# Patient Record
Sex: FEMALE | Race: BLACK OR AFRICAN AMERICAN | NOT HISPANIC OR LATINO | URBAN - METROPOLITAN AREA
[De-identification: names, ages, dates, MRNs, and addresses within clinical notes are randomized per-mention and may not be internally consistent; named-entity substitution may affect disease eponyms.]

---

## 2019-07-27 ENCOUNTER — HOSPITAL ENCOUNTER (EMERGENCY)
Facility: HOSPITAL | Age: 59
Discharge: HOME | End: 2019-07-27
Attending: EMERGENCY MEDICINE
Payer: COMMERCIAL

## 2019-07-27 VITALS
HEIGHT: 65 IN | DIASTOLIC BLOOD PRESSURE: 77 MMHG | BODY MASS INDEX: 16.16 KG/M2 | TEMPERATURE: 98 F | HEART RATE: 92 BPM | SYSTOLIC BLOOD PRESSURE: 135 MMHG | WEIGHT: 97 LBS | OXYGEN SATURATION: 100 % | RESPIRATION RATE: 15 BRPM

## 2019-07-27 DIAGNOSIS — S99.922A INJURY, FOOT, LEFT, INITIAL ENCOUNTER: ICD-10-CM

## 2019-07-27 DIAGNOSIS — M79.662 PAIN IN LEFT LOWER LEG: ICD-10-CM

## 2019-07-27 DIAGNOSIS — M25.562 LEFT KNEE PAIN: ICD-10-CM

## 2019-07-27 DIAGNOSIS — E87.6 HYPOKALEMIA: ICD-10-CM

## 2019-07-27 DIAGNOSIS — M25.572 ACUTE LEFT ANKLE PAIN: ICD-10-CM

## 2019-07-27 DIAGNOSIS — S92.902A CLOSED FRACTURE OF LEFT FOOT, INITIAL ENCOUNTER: Primary | ICD-10-CM

## 2019-07-27 DIAGNOSIS — R73.9 HYPERGLYCEMIA: ICD-10-CM

## 2019-07-27 PROBLEM — I10 HTN (HYPERTENSION), BENIGN: Status: ACTIVE | Noted: 2018-10-31

## 2019-07-27 PROBLEM — E78.2 MIXED HYPERLIPIDEMIA: Status: ACTIVE | Noted: 2018-10-31

## 2019-07-27 PROBLEM — E11.319 DIABETIC RETINOPATHY ASSOCIATED WITH TYPE 2 DIABETES MELLITUS: Status: ACTIVE | Noted: 2018-10-31

## 2019-07-27 PROBLEM — G89.29 CHRONIC PAIN OF LEFT KNEE: Status: ACTIVE | Noted: 2018-10-31

## 2019-07-27 LAB
ALBUMIN SERPL-MCNC: 3.9 G/DL (ref 3.3–5.5)
ALLENS TEST: ABNORMAL
ALP SERPL-CCNC: 52 U/L (ref 42–141)
BILIRUB SERPL-MCNC: 3.6 MG/DL (ref 0.2–1.6)
BILIRUBIN, POC UA: ABNORMAL
BLOOD, POC UA: NEGATIVE
BUN SERPL-MCNC: 11 MG/DL (ref 7–22)
CALCIUM SERPL-MCNC: 9.8 MG/DL (ref 8–10.3)
CHLORIDE SERPL-SCNC: 98 MMOL/L (ref 98–108)
CLARITY, POC UA: CLEAR
COLOR, POC UA: YELLOW
CREAT SERPL-MCNC: 0.7 MG/DL (ref 0.6–1.2)
DELSYS: ABNORMAL
GLUCOSE SERPL-MCNC: 231 MG/DL (ref 73–118)
GLUCOSE, POC UA: ABNORMAL
HCO3 UR-SCNC: 31 MMOL/L (ref 24–28)
KETONES, POC UA: ABNORMAL
LDH SERPL L TO P-CCNC: 1.15 MMOL/L (ref 0.5–2.2)
LEUKOCYTE EST, POC UA: NEGATIVE
NITRITE, POC UA: NEGATIVE
PCO2 BLDA: 52.3 MMHG (ref 35–45)
PH SMN: 7.38 [PH] (ref 7.35–7.45)
PH UR STRIP: 5.5 [PH]
PO2 BLDA: 23 MMHG (ref 40–60)
POC ALT (SGPT): 15 U/L (ref 10–47)
POC AST (SGOT): 18 U/L (ref 11–38)
POC BE: 5 MMOL/L
POC SATURATED O2: 38 % (ref 95–100)
POC TCO2: 31 MMOL/L (ref 18–33)
POC TCO2: 33 MMOL/L (ref 24–29)
POCT GLUCOSE: 186 MG/DL (ref 70–110)
POCT GLUCOSE: 275 MG/DL (ref 70–110)
POTASSIUM BLD-SCNC: 3.3 MMOL/L (ref 3.6–5.1)
PROTEIN, POC UA: ABNORMAL
PROTEIN, POC: 7.3 G/DL (ref 6.4–8.1)
SAMPLE: ABNORMAL
SITE: ABNORMAL
SODIUM BLD-SCNC: 137 MMOL/L (ref 128–145)
SPECIFIC GRAVITY, POC UA: 1.02
UROBILINOGEN, POC UA: 2 E.U./DL

## 2019-07-27 PROCEDURE — 82803 BLOOD GASES ANY COMBINATION: CPT | Mod: ER

## 2019-07-27 PROCEDURE — 25000003 PHARM REV CODE 250: Mod: ER | Performed by: EMERGENCY MEDICINE

## 2019-07-27 PROCEDURE — 96360 HYDRATION IV INFUSION INIT: CPT | Mod: ER

## 2019-07-27 PROCEDURE — 80053 COMPREHEN METABOLIC PANEL: CPT | Mod: ER

## 2019-07-27 PROCEDURE — 81003 URINALYSIS AUTO W/O SCOPE: CPT | Mod: ER

## 2019-07-27 PROCEDURE — 85025 COMPLETE CBC W/AUTO DIFF WBC: CPT | Mod: ER

## 2019-07-27 PROCEDURE — 96361 HYDRATE IV INFUSION ADD-ON: CPT | Mod: ER

## 2019-07-27 PROCEDURE — 99291 CRITICAL CARE FIRST HOUR: CPT | Mod: 25,ER

## 2019-07-27 PROCEDURE — 63600175 PHARM REV CODE 636 W HCPCS: Mod: ER | Performed by: EMERGENCY MEDICINE

## 2019-07-27 RX ORDER — METHOCARBAMOL 500 MG/1
500 TABLET, FILM COATED ORAL
Status: COMPLETED | OUTPATIENT
Start: 2019-07-27 | End: 2019-07-27

## 2019-07-27 RX ORDER — POTASSIUM CHLORIDE 20 MEQ/1
40 TABLET, EXTENDED RELEASE ORAL
Status: COMPLETED | OUTPATIENT
Start: 2019-07-27 | End: 2019-07-27

## 2019-07-27 RX ORDER — TRAMADOL HYDROCHLORIDE 50 MG/1
50 TABLET ORAL ONCE
Status: COMPLETED | OUTPATIENT
Start: 2019-07-27 | End: 2019-07-27

## 2019-07-27 RX ORDER — TRAMADOL HYDROCHLORIDE 50 MG/1
50 TABLET ORAL EVERY 6 HOURS PRN
Qty: 12 TABLET | Refills: 0 | Status: SHIPPED | OUTPATIENT
Start: 2019-07-27 | End: 2019-08-06

## 2019-07-27 RX ORDER — IBUPROFEN 600 MG/1
600 TABLET ORAL
Status: COMPLETED | OUTPATIENT
Start: 2019-07-27 | End: 2019-07-27

## 2019-07-27 RX ADMIN — IBUPROFEN 600 MG: 600 TABLET, FILM COATED ORAL at 03:07

## 2019-07-27 RX ADMIN — METHOCARBAMOL TABLETS 500 MG: 500 TABLET, COATED ORAL at 03:07

## 2019-07-27 RX ADMIN — POTASSIUM CHLORIDE 40 MEQ: 20 TABLET, EXTENDED RELEASE ORAL at 06:07

## 2019-07-27 RX ADMIN — SODIUM CHLORIDE 1000 ML: 0.9 INJECTION, SOLUTION INTRAVENOUS at 05:07

## 2019-07-27 RX ADMIN — TRAMADOL HYDROCHLORIDE 50 MG: 50 TABLET, FILM COATED ORAL at 06:07

## 2019-07-27 RX ADMIN — SODIUM CHLORIDE 1000 ML: 0.9 INJECTION, SOLUTION INTRAVENOUS at 04:07

## 2019-07-27 NOTE — ED PROVIDER NOTES
Encounter Date: 7/27/2019    SCRIBE #1 NOTE: I, Carol Benitez, am scribing for, and in the presence of,  Dr. Abad. I have scribed the following portions of the note - Other sections scribed: HPI, ROS, PE.       History     Chief Complaint   Patient presents with    Foot Injury     pt c/o trip and fall injuring top of left foot yesterday. Swelling / pain to ankle and foot.      59 year old female complaining of pain to top of left foot since yesterday s/p falling. Patient is experiencing swelling, bruising, pain to left ankle/knee, and color change to foot (black). Patient a diabetic and her sugar has been unusually high.    The history is provided by the patient. No  was used.     Review of patient's allergies indicates:  No Known Allergies  Past Medical History:   Diagnosis Date    Diabetes mellitus     Hypertension      Past Surgical History:   Procedure Laterality Date    KNEE SURGERY       History reviewed. No pertinent family history.  Social History     Tobacco Use    Smoking status: Never Smoker    Smokeless tobacco: Never Used   Substance Use Topics    Alcohol use: Not on file    Drug use: Not on file     Review of Systems   Constitutional: Negative for fever.   Eyes: Negative for redness.   Respiratory: Negative for shortness of breath.    Musculoskeletal: Positive for arthralgias (left foot) and joint swelling.   Skin: Positive for color change. Negative for rash and wound.   Neurological: Negative for weakness and numbness.   All other systems reviewed and are negative.      Physical Exam     Initial Vitals [07/27/19 1520]   BP Pulse Resp Temp SpO2   115/74 (!) 112 16 98.5 °F (36.9 °C) 100 %      MAP       --         Patient gave consent to have physical exam performed.    Physical Exam    Nursing note and vitals reviewed.  Constitutional: Vital signs are normal. She appears well-developed and well-nourished.   HENT:   Head: Normocephalic and atraumatic.   Right Ear:  External ear normal.   Left Ear: External ear normal.   Nose: Nose normal.   Mouth/Throat: Oropharynx is clear and moist.   Eyes: Conjunctivae and EOM are normal. Pupils are equal, round, and reactive to light.   Neck: Normal range of motion. Neck supple.   Cardiovascular: Normal rate, regular rhythm, normal heart sounds, intact distal pulses and normal pulses. Exam reveals no gallop, no friction rub and no decreased pulses.    No murmur heard.  Pulmonary/Chest: Effort normal and breath sounds normal. No respiratory distress. She has no wheezes. She has no rhonchi. She has no rales. She exhibits no tenderness.   Abdominal: Soft. Normal appearance and bowel sounds are normal. She exhibits no mass. There is no tenderness. There is no rigidity, no rebound and no guarding.   Musculoskeletal: Normal range of motion.        Left ankle: She exhibits swelling.        Left lower leg: She exhibits tenderness. She exhibits no bony tenderness, no swelling and no deformity.        Left foot: There is tenderness and swelling.   Neurological: She is alert and oriented to person, place, and time. No cranial nerve deficit.   Skin: Skin is warm and dry. Capillary refill takes less than 2 seconds. No rash noted.   Psychiatric: She has a normal mood and affect.         ED Course   Critical Care  Date/Time: 7/27/2019 5:33 PM  Performed by: Yaneli Abad DO  Authorized by: Yaneli Abad DO   Direct patient critical care time: 8 minutes  Additional history critical care time: 8 minutes  Ordering / reviewing critical care time: 8 minutes  Documentation critical care time: 8 minutes  Total critical care time (exclusive of procedural time) : 32 minutes  Subsequent provider of critical care: I assumed direction of critical care for this patient from another provider of my specialty.        Labs Reviewed   POCT URINALYSIS W/O SCOPE - Abnormal; Notable for the following components:       Result Value    Glucose, UA 2+ (*)     Bilirubin, UA 1+ (*)      Ketones, UA Trace (*)     Blood, UA Negative (*)     Protein, UA Trace (*)     Urobilinogen, UA 2.0 (*)     Nitrite, UA Negative (*)     Leukocytes, UA Negative (*)     All other components within normal limits   POCT GLUCOSE - Abnormal; Notable for the following components:    POCT Glucose 275 (*)     All other components within normal limits   ISTAT PROCEDURE - Abnormal; Notable for the following components:    POC PCO2 52.3 (*)     POC PO2 23 (*)     POC HCO3 31.0 (*)     POC SATURATED O2 38 (*)     POC TCO2 33 (*)     All other components within normal limits   POCT CMP - Abnormal; Notable for the following components:    POC Glucose 231 (*)     POC Potassium 3.3 (*)     Bilirubin 3.6 (*)     All other components within normal limits   POCT GLUCOSE - Abnormal; Notable for the following components:    POCT Glucose 186 (*)     All other components within normal limits   POCT URINALYSIS W/O SCOPE   POCT CBC   POCT GLUCOSE, HAND-HELD DEVICE   POCT CMP          Imaging Results           CT Foot Without Contrast Left (Final result)  Result time 07/27/19 19:15:51    Final result by Tiesha Mondragon MD (07/27/19 19:15:51)                 Impression:      Multiple foot fractures, as above described.  Mottled appearance of the bony substance.  Additional fractures cannot be entirely excluded.    This report was flagged in Epic as abnormal.      Electronically signed by: Tiesha Mondragon  Date:    07/27/2019  Time:    19:15             Narrative:    EXAMINATION:  CT LEFT FOOT WITHOUT CONTRAST    CLINICAL HISTORY:  Fracture, foot;    TECHNIQUE:  1.25 mm axial images were obtained through the left foot.  Coronal and sagittal reformatted images were submitted.    COMPARISON:  Plain radiographs from 07/27/2019    FINDINGS:  The foot is edematous.  There is a mottled appearance of the bone substance.  Question underlying metabolic disorder, neoplasm and or boned demineralization.  There are multiple bony fractures of the  foot.  There are subtle fractures involving the necks of the 2nd, 3rd, and 4th metatarsal necks, which are best seen on the plain radiographs.  A mildly comminuted fracture is seen at the base of the 1st metatarsal (series 3 coronal image 104).  A tiny clips left is seen at the under surface at of the lateral cuneiform (series 300 sagittal image 25).  Fracture lines are seen in the cuneiform (series 3 coronal image 85).  There is small calcaneal and plantar spurring.  Additional fractures cannot be entirely excluded.                               X-Ray Foot Complete Left (Edited Result - FINAL)  Result time 07/27/19 18:36:01    Addendum 1 of 1 by Rosalio Cantu MD (07/27/19 18:36:01)      Additionally, there is concern for nondisplaced fracture involving the base of the 1st metatarsal.  Differential for the osseous lucency would include malignancy such as multiple myeloma.  Correlation is needed.      Electronically signed by: Rosalio Cantu MD  Date:    07/27/2019  Time:    18:36               Final result by Rosalio Cantu MD (07/27/19 16:42:46)                 Impression:      1. Prominent osteopenia, primarily involving the proximal aspect of the foot as well as the calcaneus.  There is a superimposed impacted fracture of the distal aspect of the 2nd metacarpal.  There is concern for additional fracture involving the distal aspect of the 3rd metacarpal.      Electronically signed by: Rosalio Cantu MD  Date:    07/27/2019  Time:    16:42             Narrative:    EXAMINATION:  XR FOOT COMPLETE 3 VIEW LEFT    CLINICAL HISTORY:  .  Unspecified injury of left foot, initial encounter    TECHNIQUE:  AP, lateral and oblique views of the left foot were performed.    COMPARISON:  None    FINDINGS:  Three views.    There is osteopenia.  Degenerative changes are noted of the 1st metatarsophalangeal joint.  There is degenerative change of the proximal foot.  There is an impacted fracture of the distal aspect of  the 2nd metatarsal.  There is concern for additional impacted fracture of the 3rd metatarsal distally.  There is edema about the dorsal distal aspect of the foot.  Degenerative changes are noted of the calcaneus.                               X-Ray Ankle Complete Left (Final result)  Result time 07/27/19 16:48:34    Final result by Tammie Paniagua MD (07/27/19 16:48:34)                 Impression:      Mildly displaced fracture of the 2nd metatarsal head.    Cortical step-off involving the 3rd and 4th metatarsal heads and base of the 1st metatarsal, concerning for nondisplaced fractures.      Electronically signed by: Tammie Paniagua  Date:    07/27/2019  Time:    16:48             Narrative:    EXAMINATION:  XR ANKLE COMPLETE 3 VIEW LEFT    CLINICAL HISTORY:  Pain in left ankle and joints of left foot    TECHNIQUE:  AP, lateral and oblique views of the left ankle were performed.    COMPARISON:  None    FINDINGS:  Talar dome is intact.  Ankle mortise is not widened.  Mildly displaced fracture of the 2nd metatarsal head.  Cortical step-off of the 3rd and 4th metatarsal heads and at the base of the 1st metatarsal.  Diffuse osteopenia.  No focal soft tissue abnormality.  Superior and inferior calcaneal enthesophytes.  No aggressive osseous abnormality.                               X-Ray Tibia Fibula 2 View Left (Final result)  Result time 07/27/19 16:46:19    Final result by Tammie Paniagua MD (07/27/19 16:46:19)                 Impression:      No acute osseous abnormality.      Electronically signed by: Tammie Paniagua  Date:    07/27/2019  Time:    16:46             Narrative:    EXAMINATION:  XR TIBIA FIBULA 2 VIEW LEFT    CLINICAL HISTORY:  Pain in left lower leg    TECHNIQUE:  AP and lateral views of the left tibia and fibula were performed.    COMPARISON:  None.    FINDINGS:  No acute, displaced fracture.  Well-corticated ossific density adjacent to the fibular head likely represents remote injury.  No focal soft  tissue abnormality.  Periarticular osteopenia. Degenerative changes of the knee and ankle joints.  No aggressive osseous abnormality.                               X-Ray Knee 3 View Left (Final result)  Result time 07/27/19 16:45:08    Final result by Tammie Paniagua MD (07/27/19 16:45:08)                 Impression:      No acute osseous abnormality.      Electronically signed by: Tammie Paniagua  Date:    07/27/2019  Time:    16:45             Narrative:    EXAMINATION:  XR KNEE 3 VIEW LEFT    CLINICAL HISTORY:  Pain in left knee    TECHNIQUE:  AP, lateral, and Merchant views of the left knee were performed.    COMPARISON:  None    FINDINGS:  No acute, displaced fracture.  Well-corticated ossific density adjacent to the fibular likely represents remote injury.  Due to technique evaluation for joint effusion is compromised.  Joint spaces are maintained.  Subchondral sclerosis and cystic changes the tibiofemoral spaces consistent with osteoarthrosis.                                 Medical Decision Making:   ED Management:  Will order POCT glucose and POCT urinalysis. Will order X-Ray foot complete left 1, X-Ray ankle complete left, X-Ray tibia/fibula 2 view left, and X-Ray knee 3 view left.    Care of this patient was turned over to me by Dr. bates.  Patient's CT evaluation is documented in the record and shows that the patient has severely demineralized bone that contributed to the multiple fracture seen in the foot.  Patient was placed in a immobilization boot and instructed to not do any weight-bearing.  Additionally I will prescribe her some pain medication.  Of greater importance is thick possible concerned that the patient has some sort of metabolic abnormality that may be contributing to this lack of mineralization of bone.  I have expressed to the patient a definite need to follow up with her primary health care physician for further evaluation the metabolic workup.  Additionally given the patient a referral  line number to call for Orthopedics as that believe that these fractures are going any close observation.  Chief complaint: left foot pain  Differential diagnosis:  Treatment in the ED: ibuprofen tablet 600 mg and methocarbamol tablet 500 mg.  Patient reports feeling better after treatment in the ER.    Discussed treatment, prescriptions, labs, and imaging results.  Fill and take prescriptions as directed.  Return to the ED if symptoms worsen or do not resolve.   Answered questions and discussed discharge plan.    Patient feels better and is ready for discharge.  Follow up with PCP/specialist in 1 day.            Scribe Attestation:   Scribe #1: I performed the above scribed service and the documentation accurately describes the services I performed. I attest to the accuracy of the note.    This document was produced by a scribe under my direction and in my presence. I agree with the content of the note and have made any necessary edits.     Shawn Bobby MD    07/27/2019 7:30 PM         Results for orders placed or performed during the hospital encounter of 07/27/19   POCT URINALYSIS W/O SCOPE   Result Value Ref Range    Glucose, UA 2+ (A)     Bilirubin, UA 1+ (A)     Ketones, UA Trace (A)     Spec Grav UA 1.025     Blood, UA Negative (NG)     PH, UA 5.5     Protein, UA Trace (A)     Urobilinogen, UA 2.0 (A) E.U./dL    Nitrite, UA Negative (NG)     Leukocytes, UA Negative (NG)     Color, UA Yellow     Clarity, UA Clear    POCT glucose   Result Value Ref Range    POCT Glucose 275 (H) 70 - 110 mg/dL   ISTAT PROCEDURE   Result Value Ref Range    POC PH 7.381 7.35 - 7.45    POC PCO2 52.3 (H) 35 - 45 mmHg    POC PO2 23 (LL) 40 - 60 mmHg    POC HCO3 31.0 (H) 24 - 28 mmol/L    POC BE 5 -2 to 2 mmol/L    POC SATURATED O2 38 (L) 95 - 100 %    POC Lactate 1.15 0.5 - 2.2 mmol/L    POC TCO2 33 (H) 24 - 29 mmol/L    Sample VENOUS     Site Other     Allens Test N/A     DelSys Room Air    POCT CMP   Result Value Ref Range     Albumin, POC 3.9 3.3 - 5.5 g/dL    Alkaline Phosphatase, POC 52 42 - 141 U/L    ALT (SGPT), POC 15 10 - 47 U/L    AST (SGOT), POC 18 11 - 38 U/L    POC BUN 11 7 - 22 mg/dL    Calcium, POC 9.8 8 - 10.3 mg/dL    POC Chloride 98 98 - 108 mmol/L    POC Creatinine 0.7 0.6 - 1.2 mg/dL    POC Glucose 231 (H) 73 - 118 mg/dL    POC Potassium 3.3 (L) 3.6 - 5.1 mmol/L    POC Sodium 137 128 - 145 mmol/L    Bilirubin 3.6 (H) 0.2 - 1.6 mg/dL    POC TCO2 31 18 - 33 mmol/L    Protein 7.3 6.4 - 8.1 g/dL   POCT glucose   Result Value Ref Range    POCT Glucose 186 (H) 70 - 110 mg/dL           Imaging Results           CT Foot Without Contrast Left (Final result)  Result time 07/27/19 19:15:51    Final result by Tiesha Mondragon MD (07/27/19 19:15:51)                 Impression:      Multiple foot fractures, as above described.  Mottled appearance of the bony substance.  Additional fractures cannot be entirely excluded.    This report was flagged in Epic as abnormal.      Electronically signed by: Tiesha Mondragon  Date:    07/27/2019  Time:    19:15             Narrative:    EXAMINATION:  CT LEFT FOOT WITHOUT CONTRAST    CLINICAL HISTORY:  Fracture, foot;    TECHNIQUE:  1.25 mm axial images were obtained through the left foot.  Coronal and sagittal reformatted images were submitted.    COMPARISON:  Plain radiographs from 07/27/2019    FINDINGS:  The foot is edematous.  There is a mottled appearance of the bone substance.  Question underlying metabolic disorder, neoplasm and or boned demineralization.  There are multiple bony fractures of the foot.  There are subtle fractures involving the necks of the 2nd, 3rd, and 4th metatarsal necks, which are best seen on the plain radiographs.  A mildly comminuted fracture is seen at the base of the 1st metatarsal (series 3 coronal image 104).  A tiny clips left is seen at the under surface at of the lateral cuneiform (series 300 sagittal image 25).  Fracture lines are seen in the cuneiform  (series 3 coronal image 85).  There is small calcaneal and plantar spurring.  Additional fractures cannot be entirely excluded.                               X-Ray Foot Complete Left (Edited Result - FINAL)  Result time 07/27/19 18:36:01    Addendum 1 of 1 by Rosalio Cantu MD (07/27/19 18:36:01)      Additionally, there is concern for nondisplaced fracture involving the base of the 1st metatarsal.  Differential for the osseous lucency would include malignancy such as multiple myeloma.  Correlation is needed.      Electronically signed by: Rosalio Cantu MD  Date:    07/27/2019  Time:    18:36               Final result by Rosalio Cantu MD (07/27/19 16:42:46)                 Impression:      1. Prominent osteopenia, primarily involving the proximal aspect of the foot as well as the calcaneus.  There is a superimposed impacted fracture of the distal aspect of the 2nd metacarpal.  There is concern for additional fracture involving the distal aspect of the 3rd metacarpal.      Electronically signed by: Rosalio Cantu MD  Date:    07/27/2019  Time:    16:42             Narrative:    EXAMINATION:  XR FOOT COMPLETE 3 VIEW LEFT    CLINICAL HISTORY:  .  Unspecified injury of left foot, initial encounter    TECHNIQUE:  AP, lateral and oblique views of the left foot were performed.    COMPARISON:  None    FINDINGS:  Three views.    There is osteopenia.  Degenerative changes are noted of the 1st metatarsophalangeal joint.  There is degenerative change of the proximal foot.  There is an impacted fracture of the distal aspect of the 2nd metatarsal.  There is concern for additional impacted fracture of the 3rd metatarsal distally.  There is edema about the dorsal distal aspect of the foot.  Degenerative changes are noted of the calcaneus.                               X-Ray Ankle Complete Left (Final result)  Result time 07/27/19 16:48:34    Final result by Tammie Paniagua MD (07/27/19 16:48:34)                  Impression:      Mildly displaced fracture of the 2nd metatarsal head.    Cortical step-off involving the 3rd and 4th metatarsal heads and base of the 1st metatarsal, concerning for nondisplaced fractures.      Electronically signed by: Tammie Paniagua  Date:    07/27/2019  Time:    16:48             Narrative:    EXAMINATION:  XR ANKLE COMPLETE 3 VIEW LEFT    CLINICAL HISTORY:  Pain in left ankle and joints of left foot    TECHNIQUE:  AP, lateral and oblique views of the left ankle were performed.    COMPARISON:  None    FINDINGS:  Talar dome is intact.  Ankle mortise is not widened.  Mildly displaced fracture of the 2nd metatarsal head.  Cortical step-off of the 3rd and 4th metatarsal heads and at the base of the 1st metatarsal.  Diffuse osteopenia.  No focal soft tissue abnormality.  Superior and inferior calcaneal enthesophytes.  No aggressive osseous abnormality.                               X-Ray Tibia Fibula 2 View Left (Final result)  Result time 07/27/19 16:46:19    Final result by Tammie Paniagua MD (07/27/19 16:46:19)                 Impression:      No acute osseous abnormality.      Electronically signed by: Tammie Paniagua  Date:    07/27/2019  Time:    16:46             Narrative:    EXAMINATION:  XR TIBIA FIBULA 2 VIEW LEFT    CLINICAL HISTORY:  Pain in left lower leg    TECHNIQUE:  AP and lateral views of the left tibia and fibula were performed.    COMPARISON:  None.    FINDINGS:  No acute, displaced fracture.  Well-corticated ossific density adjacent to the fibular head likely represents remote injury.  No focal soft tissue abnormality.  Periarticular osteopenia. Degenerative changes of the knee and ankle joints.  No aggressive osseous abnormality.                               X-Ray Knee 3 View Left (Final result)  Result time 07/27/19 16:45:08    Final result by Tammie Paniagua MD (07/27/19 16:45:08)                 Impression:      No acute osseous abnormality.      Electronically signed by: Tammie  Siva  Date:    07/27/2019  Time:    16:45             Narrative:    EXAMINATION:  XR KNEE 3 VIEW LEFT    CLINICAL HISTORY:  Pain in left knee    TECHNIQUE:  AP, lateral, and Merchant views of the left knee were performed.    COMPARISON:  None    FINDINGS:  No acute, displaced fracture.  Well-corticated ossific density adjacent to the fibular likely represents remote injury.  Due to technique evaluation for joint effusion is compromised.  Joint spaces are maintained.  Subchondral sclerosis and cystic changes the tibiofemoral spaces consistent with osteoarthrosis.                                Clinical Impression:     1. Closed fracture of left foot, initial encounter    2. Injury, foot, left, initial encounter    3. Acute left ankle pain    4. Pain in left lower leg    5. Left knee pain    6. Hyperglycemia    7. Hypokalemia                                   Shawn Bobby MD  07/27/19 1930

## 2019-09-13 DIAGNOSIS — M85.50 ANEURYSMAL BONE CYST: ICD-10-CM

## 2019-09-13 DIAGNOSIS — S92.355A NONDISP FX OF FIFTH METATARSAL BONE, LEFT FOOT, INIT: ICD-10-CM

## 2019-09-13 DIAGNOSIS — S92.355A NONDISPLACED FRACTURE OF FIFTH LEFT METATARSAL BONE: ICD-10-CM

## 2019-09-13 DIAGNOSIS — S92.345A: Primary | ICD-10-CM

## 2021-03-06 ENCOUNTER — IMMUNIZATION (OUTPATIENT)
Dept: PRIMARY CARE CLINIC | Facility: CLINIC | Age: 61
End: 2021-03-06
Payer: COMMERCIAL

## 2021-03-06 DIAGNOSIS — Z23 NEED FOR VACCINATION: Primary | ICD-10-CM

## 2021-03-06 PROCEDURE — 0001A PR IMMUNIZ ADMIN, SARS-COV-2 COVID-19 VACC, 30MCG/0.3ML, 1ST DOSE: ICD-10-PCS | Mod: CV19,S$GLB,, | Performed by: INTERNAL MEDICINE

## 2021-03-06 PROCEDURE — 0001A PR IMMUNIZ ADMIN, SARS-COV-2 COVID-19 VACC, 30MCG/0.3ML, 1ST DOSE: CPT | Mod: CV19,S$GLB,, | Performed by: INTERNAL MEDICINE

## 2021-03-06 PROCEDURE — 91300 PR SARS-COV- 2 COVID-19 VACCINE, NO PRSV, 30MCG/0.3ML, IM: CPT | Mod: S$GLB,,, | Performed by: INTERNAL MEDICINE

## 2021-03-06 PROCEDURE — 91300 PR SARS-COV- 2 COVID-19 VACCINE, NO PRSV, 30MCG/0.3ML, IM: ICD-10-PCS | Mod: S$GLB,,, | Performed by: INTERNAL MEDICINE

## 2021-03-06 RX ADMIN — Medication 0.3 ML: at 07:03

## 2021-03-27 ENCOUNTER — IMMUNIZATION (OUTPATIENT)
Dept: PRIMARY CARE CLINIC | Facility: CLINIC | Age: 61
End: 2021-03-27
Payer: COMMERCIAL

## 2021-03-27 DIAGNOSIS — Z23 NEED FOR VACCINATION: Primary | ICD-10-CM

## 2021-03-27 PROCEDURE — 0002A PR IMMUNIZ ADMIN, SARS-COV-2 COVID-19 VACC, 30MCG/0.3ML, 2ND DOSE: CPT | Mod: CV19,S$GLB,, | Performed by: INTERNAL MEDICINE

## 2021-03-27 PROCEDURE — 91300 PR SARS-COV- 2 COVID-19 VACCINE, NO PRSV, 30MCG/0.3ML, IM: CPT | Mod: S$GLB,,, | Performed by: INTERNAL MEDICINE

## 2021-03-27 PROCEDURE — 91300 PR SARS-COV- 2 COVID-19 VACCINE, NO PRSV, 30MCG/0.3ML, IM: ICD-10-PCS | Mod: S$GLB,,, | Performed by: INTERNAL MEDICINE

## 2021-03-27 PROCEDURE — 0002A PR IMMUNIZ ADMIN, SARS-COV-2 COVID-19 VACC, 30MCG/0.3ML, 2ND DOSE: ICD-10-PCS | Mod: CV19,S$GLB,, | Performed by: INTERNAL MEDICINE

## 2021-03-27 RX ADMIN — Medication 0.3 ML: at 07:03
